# Patient Record
Sex: MALE | Race: NATIVE HAWAIIAN OR OTHER PACIFIC ISLANDER | NOT HISPANIC OR LATINO | Employment: FULL TIME | URBAN - METROPOLITAN AREA
[De-identification: names, ages, dates, MRNs, and addresses within clinical notes are randomized per-mention and may not be internally consistent; named-entity substitution may affect disease eponyms.]

---

## 2019-07-03 ENCOUNTER — OFFICE VISIT (OUTPATIENT)
Dept: INTERNAL MEDICINE CLINIC | Facility: CLINIC | Age: 34
End: 2019-07-03
Payer: COMMERCIAL

## 2019-07-03 VITALS
TEMPERATURE: 98.1 F | HEIGHT: 70 IN | BODY MASS INDEX: 31.98 KG/M2 | DIASTOLIC BLOOD PRESSURE: 78 MMHG | HEART RATE: 83 BPM | WEIGHT: 223.4 LBS | OXYGEN SATURATION: 99 % | SYSTOLIC BLOOD PRESSURE: 122 MMHG

## 2019-07-03 DIAGNOSIS — Z13.29 SCREENING FOR THYROID DISORDER: ICD-10-CM

## 2019-07-03 DIAGNOSIS — Z13.220 SCREENING FOR HYPERCHOLESTEROLEMIA: ICD-10-CM

## 2019-07-03 DIAGNOSIS — Z11.3 SCREEN FOR STD (SEXUALLY TRANSMITTED DISEASE): ICD-10-CM

## 2019-07-03 DIAGNOSIS — Z13.1 SCREENING FOR DIABETES MELLITUS: ICD-10-CM

## 2019-07-03 DIAGNOSIS — R21 RASH: Primary | ICD-10-CM

## 2019-07-03 DIAGNOSIS — Z13.0 ENCOUNTER FOR SCREENING FOR DISEASES OF THE BLOOD AND BLOOD-FORMING ORGANS AND CERTAIN DISORDERS INVOLVING THE IMMUNE MECHANISM: ICD-10-CM

## 2019-07-03 DIAGNOSIS — Z11.4 SCREENING FOR HIV (HUMAN IMMUNODEFICIENCY VIRUS): ICD-10-CM

## 2019-07-03 PROCEDURE — 1036F TOBACCO NON-USER: CPT | Performed by: INTERNAL MEDICINE

## 2019-07-03 PROCEDURE — 3008F BODY MASS INDEX DOCD: CPT | Performed by: INTERNAL MEDICINE

## 2019-07-03 PROCEDURE — 99203 OFFICE O/P NEW LOW 30 MIN: CPT | Performed by: INTERNAL MEDICINE

## 2019-07-03 RX ORDER — AMOXICILLIN 500 MG/1
500 CAPSULE ORAL EVERY 8 HOURS SCHEDULED
Qty: 21 CAPSULE | Refills: 0 | Status: SHIPPED | OUTPATIENT
Start: 2019-07-03 | End: 2019-07-10

## 2019-07-03 NOTE — PATIENT INSTRUCTIONS
Problem List Items Addressed This Visit        Musculoskeletal and Integument    Rash - Primary     Possibly the beginning of an ingrown hair, will treat with amoxicillin with the thickening of the skin in the area  No signs of a defined boil, but discussed with patient to watch for this as it could potentially need drainage  Discussed with patient other possibilities like shingles, but there are no vesicles, but he was instructed to keep an eye out for this  Also discussed tinea corporis (ringworm) but patient has no scaling and rash is not typical for this           Relevant Medications    amoxicillin (AMOXIL) 500 mg capsule

## 2019-07-03 NOTE — PROGRESS NOTES
Assessment/Plan:    Rash  Possibly the beginning of an ingrown hair, will treat with amoxicillin with the thickening of the skin in the area  No signs of a defined boil, but discussed with patient to watch for this as it could potentially need drainage  Discussed with patient other possibilities like shingles, but there are no vesicles, but he was instructed to keep an eye out for this  Also discussed tinea corporis (ringworm) but patient has no scaling and rash is not typical for this  Diagnoses and all orders for this visit:    Rash  -     amoxicillin (AMOXIL) 500 mg capsule; Take 1 capsule (500 mg total) by mouth every 8 (eight) hours for 7 days          Subjective:      Patient ID: Aminah Olvera is a 27 y o  male  Pt developed a rash on the left side of his face 3 days ago, at 1st it slightly itch to, then patient develop some swelling of the glands in his neck  No fevers, no sore throat  The following portions of the patient's history were reviewed and updated as appropriate: allergies, current medications, past family history, past medical history, past social history, past surgical history and problem list     Review of Systems   Constitutional: Negative for chills, fatigue and fever  HENT: Negative for congestion, nosebleeds, postnasal drip, sore throat and trouble swallowing  Eyes: Negative for pain  Respiratory: Negative for cough, chest tightness, shortness of breath and wheezing  Cardiovascular: Positive for palpitations (Occasional)  Negative for chest pain and leg swelling  Gastrointestinal: Negative for abdominal pain, constipation, diarrhea, nausea and vomiting  Endocrine: Negative for polydipsia and polyuria  Genitourinary: Negative for dysuria, flank pain and hematuria  Musculoskeletal: Negative for arthralgias  Skin: Positive for rash  Neurological: Negative for dizziness, tremors and headaches  Hematological: Does not bruise/bleed easily  Psychiatric/Behavioral: Negative for confusion and dysphoric mood  The patient is not nervous/anxious  Objective:      /78   Pulse 83   Temp 98 1 °F (36 7 °C)   Ht 5' 10" (1 778 m)   Wt 101 kg (223 lb 6 4 oz)   SpO2 99%   BMI 32 05 kg/m²          Physical Exam   Constitutional: He is oriented to person, place, and time  He appears well-developed and well-nourished  No distress  HENT:   Head: Normocephalic and atraumatic  Right Ear: External ear normal    Left Ear: External ear normal    Eyes: Conjunctivae are normal  No scleral icterus  Neck: Normal range of motion  Neck supple  No tracheal deviation present  No thyromegaly present  Cardiovascular: Normal rate, regular rhythm and normal heart sounds  No murmur heard  Pulmonary/Chest: Effort normal and breath sounds normal  No respiratory distress  He has no wheezes  He has no rales  Abdominal: Soft  Bowel sounds are normal  There is no tenderness  There is no rebound and no guarding  Hernia confirmed negative in the right inguinal area and confirmed negative in the left inguinal area  Genitourinary: Testes normal  Right testis shows no mass  Left testis shows no mass  Musculoskeletal: He exhibits no edema  Lymphadenopathy:     He has no cervical adenopathy  Neurological: He is alert and oriented to person, place, and time  Skin:   Left lower cheek has some mild erythema with some thickening of the skin, no lesions seen in mouth, no vesicles at patch of erythema   Psychiatric: He has a normal mood and affect  His behavior is normal  Judgment and thought content normal    Vitals reviewed

## 2019-07-03 NOTE — ASSESSMENT & PLAN NOTE
Possibly the beginning of an ingrown hair, will treat with amoxicillin with the thickening of the skin in the area  No signs of a defined boil, but discussed with patient to watch for this as it could potentially need drainage  Discussed with patient other possibilities like shingles, but there are no vesicles, but he was instructed to keep an eye out for this  Also discussed tinea corporis (ringworm) but patient has no scaling and rash is not typical for this